# Patient Record
Sex: MALE
[De-identification: names, ages, dates, MRNs, and addresses within clinical notes are randomized per-mention and may not be internally consistent; named-entity substitution may affect disease eponyms.]

---

## 2017-03-28 PROBLEM — Z00.00 ENCOUNTER FOR PREVENTIVE HEALTH EXAMINATION: Status: ACTIVE | Noted: 2017-03-28

## 2017-06-29 ENCOUNTER — APPOINTMENT (OUTPATIENT)
Dept: NEUROLOGY | Facility: CLINIC | Age: 47
End: 2017-06-29

## 2017-07-21 ENCOUNTER — APPOINTMENT (OUTPATIENT)
Dept: NEUROLOGY | Facility: CLINIC | Age: 47
End: 2017-07-21

## 2024-08-14 ENCOUNTER — APPOINTMENT (OUTPATIENT)
Dept: PSYCHIATRY | Facility: CLINIC | Age: 54
End: 2024-08-14

## 2024-08-15 NOTE — HISTORY OF PRESENT ILLNESS
[Not Applicable] : Not applicable [FreeTextEntry1] : Mitul is a 54-year-old male domiciled in a private home with his ex-girlfriend on [address]. The patient reports that he will be moving out of their shared home in six weeks where he will then live independently on [address]. The patient is currently employed full time as an  with [employer], however, due to his anxiety and depression he has been unable to  overtime shifts that would significantly improve his financial situation. 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 The patient reports that he feels as if he is going to "jump out of my skin". He notes that he has had night sweats, however, he has not had them in a week. The patient reports that he is feeling depressed which includes having trouble getting up in the morning, feelings of sadness and periods of crying. The patient reports that these feelings have caused low self-esteem. The patient reports chronic anxiety. The patient reports that he has difficulty being around others and his extremities get "funny" and cold. The patient reports that these feelings are "unnerving". The patient is currently on 10mg of Lexapro that he is getting from his PCP, Dr. Acevedo. Patient reports that his PCP is also prescribing him Trazadone 100mg. The patient reports that the medication is helping him to fall asleep. The patient explains that he has been on the Trazadone since June and the Lexapro since July. The patient reports very little positive side effects of the medication. The patient denied any current suicidal ideations.   [FreeTextEntry2] : N/A  [FreeTextEntry3] : N/A

## 2024-08-15 NOTE — PSYCHOSOCIAL ASSESSMENT
[None known] : None known [All substances negative except as specified below] : All substances negative except as specified below [Other: _____] : [unfilled] [Competitive and integrated employment] : Competitive and integrated employment [35 hours or more] : 35 hours or more [Earned income] : earned income [None] : none [Private residence (home, apartment, rooming house, hotel, motel, supported housing, supported Single Room Occupancy (SRO),] : Private residence (home, apartment, rooming house, hotel, motel, supported housing, supported Single Room Occupancy (SRO), permanent housing programs, transient housing programs, and shelter plus care housing) [Client's spouse or domestic partner] : client's spouse or domestic partner [Yes (add details)] : Prior or current active [place]  service? Yes [No] : Patient has personal representation (legal guardian, representative payee, conservatorship)? No [FreeTextEntry2] : The patient reports that he has a good relationship with his ex-wife and her current partner. Patient explains that he would describe them along with two good friends as his main support system  [FreeTextEntry3] : Not very well. The patient reports that he rarely goes out and when he does go out it is very briefly  [FreeTextEntry1] : The patient identifies as Mu-ism. The patient is a heterosexual cisgender male of [place] descent  [Financially stable] : financially stable [Friend] : friend [Good] : good [Frequent Contact] : frequent contact [FreeTextEntry7] : Roselia (ex wife)

## 2024-08-15 NOTE — HISTORY OF PRESENT ILLNESS
[Not Applicable] : Not applicable [FreeTextEntry1] : Mitul is a 54-year-old male domiciled in a private home with his ex-girlfriend on [address]. The patient reports that he will be moving out of their shared home in six weeks where he will then live independently on [address]. The patient is currently employed full time as an  with [employer], however, due to his anxiety and depression he has been unable to  overtime shifts that would significantly improve his financial situation. 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 The patient reports that he feels as if he is going to "jump out of my skin". He notes that he has had night sweats, however, he has not had them in a week. The patient reports that he is feeling depressed which includes having trouble getting up in the morning, feelings of sadness and periods of crying. The patient reports that these feelings have caused low self-esteem. The patient reports chronic anxiety. The patient reports that he has difficulty being around others and his extremities get "funny" and cold. The patient reports that these feelings are "unnerving". The patient is currently on 10mg of Lexapro that he is getting from his PCP, Dr. Acevedo. Patient reports that his PCP is also prescribing him Trazadone 100mg. The patient reports that the medication is helping him to fall asleep. The patient explains that he has been on the Trazadone since June and the Lexapro since July. The patient reports very little positive side effects of the medication. The patient denied any current suicidal ideations.   [FreeTextEntry2] : N/A  [FreeTextEntry3] : N/A

## 2024-08-15 NOTE — REASON FOR VISIT
[Number can be texted] : number can be texted [OK  to leave message] : OK  to leave message [FreeTextEntry4] : 2pm  [FreeTextEntry3] : [email or web address]  [FreeTextEntry5] : English  [FreeTextEntry6] : Mitul  [FreeTextEntry7] : he/him/his  [Community Based Organization] : Community Based Organization [Non-[facility] Health Provider/Facility] : Non-[facility] Health Provider/Facility [Patient] : Patient [FreeTextEntry2] : The patient was referred to the clinic for the certified diagnosis of PTSD. The patient endorses periods of chronic anxiety and depression  [FreeTextEntry1] : "I want to feel peace and to feel calm"

## 2024-08-15 NOTE — RISK ASSESSMENT
[Clinical Interview] : Clinical Interview [Identifies reasons for living] : identifies reasons for living [Supportive social network of family or friends] : supportive social network of family or friends [Cultural, spiritual and/or moral attitudes against suicide] : cultural, spiritual and/or moral attitudes against suicide [Positive therapeutic relationships] : positive therapeutic relationships [Responsibility to children, family, or others] : responsibility to children, family, or others [(0) Does not apply] : Does not apply [Mood disorder] : mood disorder [PTSD] : PTSD [Depressed mood/Anhedonia] : depressed mood/anhedonia [Severe anxiety, agitation or panic] : severe anxiety, agitation or panic [None known] : None known [None in the patient's lifetime] : None in the patient's lifetime [Residential stability] : residential stability [Relationship stability] : relationship stability [Employment stability] : employment stability [Affective stability] : affective stability [Sobriety] : sobriety [Insight into violence risk and need for management/treatment] : insight into violence risk and need for management/treatment [Engagement in treatment] : engagement in treatment [Good treatment response/compliance] : good treatment response/compliance [No] : no

## 2024-08-15 NOTE — DISCUSSION/SUMMARY
[FreeTextEntry1] : Mitul is a 54-year-old male domiciled in a private home with his ex-girlfriend on [address]. The patient reports that he will be moving out of their shared home in six weeks where he will then live independently on [address]. The patient is currently employed full time as an  with [employer], however, due to his anxiety and depression he has been unable to  overtime shifts that would significantly improve his financial situation. 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 The patient reports that he feels as if he is going to "jump out of my skin". He notes that he has had night sweats, however, he has not had them in a week. The patient reports that he is feeling depressed which includes having trouble getting up in the morning, feelings of sadness and periods of crying. The patient reports that these feelings have caused low self-esteem. The patient reports chronic anxiety. The patient reports that he has difficulty being around others and his extremities get "funny" and cold. The patient reports that these feelings are "unnerving". The patient is currently on 10mg of Lexapro that he is getting from his PCP, Dr. Acevedo. Patient reports that his PCP is also prescribing him Trazadone 100mg. The patient reports that the medication is helping him to fall asleep. The patient explains that he has been on the Trazadone since June and the Lexapro since July. The patient reports very little positive side effects of the medication. The patient denied any current suicidal ideations.  [Low acute suicide risk] : Low acute suicide risk [No] : No [Not clinically indicated] : Safety Plan completed/updated (for individuals at risk): Not clinically indicated

## 2024-08-15 NOTE — SOCIAL HISTORY
[FreeTextEntry1] : Legal Status  Does individual Served have a Legal Guardian, rep Payee or Conservatorship? [ X] No [ ] Yes - Details: [ ]   Legal Involvement history  Does the individual have a history of or current involvement with the legal system?  [X ] No [ ] Yes - Details: [ ]    Services  Have you ever served in the ?  [ ] No [X ] Yes - Details: The patient reports that he was in the [facility] for less than a year in 1991   Employment history: The patient is employed as an    Developmental history: N/A   Sexual hx/identity Sexual History/ Concern (include sexual orientation and other relevant information): The patient identifies as a cisgender heterosexaul male   Race - ethnicity - culture information [ ]    Social supports (friends, Volunteers, club, AA meeting, other meetings) ?: The patient reports that he has a few friends and his ex-wife and her   Meaningful Activities: The patient reports that he has been going to lunch with friends and he will occassionally smoke a cigar    Spiritual Assessment Tool - MILADIS KENYON. What is your willian or belief? Scientology  Do you consider yourself spiritual or Nondenominational?  Both  Is there something you believe in that gives meaning to your life? "I am not sure"  I: Is it important in your life? N/A  What influence does it have on how you take care of yourself? N/A  How have your beliefs influenced your behavior during this illness? What role do your beliefs play in regaining your health? N/A C. Are you part of a spiritual or Nondenominational community? no  Is this of support to you and how? N/A  Is there a person or group of people you really love or who are really important to you? "I love my children"  H. We have been discussing your belief and supports. What else gives you internal support? "Not sure"  What are your sources of hope, strength, comfort and peace? "Thinking about my next move"  What do you hold on to during difficult times? "Knowing that I will not be like this forever"  what sustains you and keeps you going? "My future"  A. How would you like me, your healthcare provider, to address these issues in your healthcare? "We can talk about it"  SMOKING CESSATION  Do you Smoke?                              [ ] Yes		[X ] No Do you want to quit? 		[ ] Yes		[ ] No -ASK 	Number of cigatettes   [ ] cigars [ ]  pipe bowls [ ]  per day  	Number of ST cans/ pouches per week [ ] 	Number of years used [ ] 	How soon after you wake up do you use tobacco?  [ ] within 30 minutes      [ ] more than 30 minutes  	Previous quit attempts:  # of attempts [ ] longest quit period [ ] methods(s) used [ ] How long ago was last attempt to quit  [ ] years [ ] months  	Reasons for wanting to quit[ ] -ADVISE   about the oral benefits of quitting -ASSESS   willingness to make a quit attempt (Stage of Change)  	    [ ] Precontemplation (Stop here & Reassess next visit)	[ ] Contemplation [ ] Preparation   -ASSIST    (depending on stage of change)  	Self-Help Pamphlets & Materials 	List of local community group/individual quit programs and phone help lines 	Encourage a quit date (for those who are ready) 	Pharmacotherapy: Nicotine gum/ Lozenge/ Patch/ Inhaler/NS/Zyban/ Chantix  	RX: 	[ ]  ()  -ARRANGE  Follow up if set a quit date (with permission) Quit Date: [ ]  Phone calls or visits:  [ ] Week 1-2  Months   [ ] 1   [ ] 3   [ ] 6   [ ] 12   -Yearly Reassessment    [ ] No Changes of Smoking [ ] Change of Smoking Habit (Non-Smoker to Smoker/ Smoker to Non Smoker) (If there is change from Non Smoker to Smoker, please fill out new BRIEF TOBACCO CESSATION INTERVENTION FORM)  Date of Yearly Reassessment : [ ] Comments:  [ ]

## 2024-08-15 NOTE — FAMILY HISTORY
[FreeTextEntry1] : Family composition: The patient reports that has two female children ages 16 and 20 years Family history and background: The patient identifies as a  male of [place] descent  Family relationship: The patient reports that he periodically sees his children and he would describe the relationship with his family as "okay"  Pertinent Family Medical, MH and Substance Use History including Adult Child of Alcoholic and child of substance abuse status; history of cancer and heart disease: The patient denied any family history of substance use, the patient denied any family history of alcoholism, the patient reports that his mother may have had cancer however he is not certain. The patient reports that his mother may have had heart disease

## 2024-08-15 NOTE — PHYSICAL EXAM
[Well groomed] : well groomed [Cooperative] : cooperative [Depressed] : depressed [Anxious] : anxious [Flat] : flat [Clear] : clear [Linear/Goal Directed] : linear/goal directed [None] : none [None Reported] : none reported [Average] : average [WNL] : within normal limits [Individual reports tobacco use during the last 30 days?] : Individual reports tobacco use during the last 30 days? No [Individual reports use of the following tobacco products during the last 30 days?] : Individual reports use of the following tobacco products during the last 30 days? No [Individual reports current use of tobacco cessation medication or nicotine replacement therapy?] : Individual reports current use of tobacco cessation medication or nicotine replacement therapy? No [Was tobacco cessation medication and/or nicotine replacement therapy recommended?] : Was tobacco cessation medication and/or nicotine replacement therapy recommended? No [Does individual accept referral to MD for cessation medication or NRT?] : Does individual accept referral to MD for cessation medication or NRT? No

## 2024-08-15 NOTE — SOCIAL HISTORY
[FreeTextEntry1] : Legal Status  Does individual Served have a Legal Guardian, rep Payee or Conservatorship? [ X] No [ ] Yes - Details: [ ]   Legal Involvement history  Does the individual have a history of or current involvement with the legal system?  [X ] No [ ] Yes - Details: [ ]    Services  Have you ever served in the ?  [ ] No [X ] Yes - Details: The patient reports that he was in the [facility] for less than a year in 1991   Employment history: The patient is employed as an    Developmental history: N/A   Sexual hx/identity Sexual History/ Concern (include sexual orientation and other relevant information): The patient identifies as a cisgender heterosexaul male   Race - ethnicity - culture information [ ]    Social supports (friends, Volunteers, club, AA meeting, other meetings) ?: The patient reports that he has a few friends and his ex-wife and her   Meaningful Activities: The patient reports that he has been going to lunch with friends and he will occassionally smoke a cigar    Spiritual Assessment Tool - MILADIS KENYON. What is your willian or belief? Voodoo  Do you consider yourself spiritual or Orthodox?  Both  Is there something you believe in that gives meaning to your life? "I am not sure"  I: Is it important in your life? N/A  What influence does it have on how you take care of yourself? N/A  How have your beliefs influenced your behavior during this illness? What role do your beliefs play in regaining your health? N/A C. Are you part of a spiritual or Orthodox community? no  Is this of support to you and how? N/A  Is there a person or group of people you really love or who are really important to you? "I love my children"  H. We have been discussing your belief and supports. What else gives you internal support? "Not sure"  What are your sources of hope, strength, comfort and peace? "Thinking about my next move"  What do you hold on to during difficult times? "Knowing that I will not be like this forever"  what sustains you and keeps you going? "My future"  A. How would you like me, your healthcare provider, to address these issues in your healthcare? "We can talk about it"  SMOKING CESSATION  Do you Smoke?                              [ ] Yes		[X ] No Do you want to quit? 		[ ] Yes		[ ] No -ASK 	Number of cigatettes   [ ] cigars [ ]  pipe bowls [ ]  per day  	Number of ST cans/ pouches per week [ ] 	Number of years used [ ] 	How soon after you wake up do you use tobacco?  [ ] within 30 minutes      [ ] more than 30 minutes  	Previous quit attempts:  # of attempts [ ] longest quit period [ ] methods(s) used [ ] How long ago was last attempt to quit  [ ] years [ ] months  	Reasons for wanting to quit[ ] -ADVISE   about the oral benefits of quitting -ASSESS   willingness to make a quit attempt (Stage of Change)  	    [ ] Precontemplation (Stop here & Reassess next visit)	[ ] Contemplation [ ] Preparation   -ASSIST    (depending on stage of change)  	Self-Help Pamphlets & Materials 	List of local community group/individual quit programs and phone help lines 	Encourage a quit date (for those who are ready) 	Pharmacotherapy: Nicotine gum/ Lozenge/ Patch/ Inhaler/NS/Zyban/ Chantix  	RX: 	[ ]  ()  -ARRANGE  Follow up if set a quit date (with permission) Quit Date: [ ]  Phone calls or visits:  [ ] Week 1-2  Months   [ ] 1   [ ] 3   [ ] 6   [ ] 12   -Yearly Reassessment    [ ] No Changes of Smoking [ ] Change of Smoking Habit (Non-Smoker to Smoker/ Smoker to Non Smoker) (If there is change from Non Smoker to Smoker, please fill out new BRIEF TOBACCO CESSATION INTERVENTION FORM)  Date of Yearly Reassessment : [ ] Comments:  [ ]

## 2024-08-15 NOTE — REASON FOR VISIT
[Number can be texted] : number can be texted [OK  to leave message] : OK  to leave message [FreeTextEntry4] : 2pm  [FreeTextEntry3] : [email or web address]  [FreeTextEntry5] : English  [FreeTextEntry6] : Mitul  [FreeTextEntry7] : he/him/his  [Community Based Organization] : Community Based Organization [Non-[facility] Health Provider/Facility] : Non-[facility] Health Provider/Facility [Patient] : Patient [FreeTextEntry2] : The patient was referred to the clinic for the certified diagnosis of PTSD. The patient endorses periods of chronic anxiety and depression  [FreeTextEntry1] : "I want to feel peace and to feel calm"

## 2024-08-15 NOTE — DISCUSSION/SUMMARY
[FreeTextEntry1] : Mitul is a 54-year-old male domiciled in a private home with his ex-girlfriend on [address]. The patient reports that he will be moving out of their shared home in six weeks where he will then live independently on [address]. The patient is currently employed full time as an  with [employer], however, due to his anxiety and depression he has been unable to  overtime shifts that would significantly improve his financial situation. 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 The patient reports that he feels as if he is going to "jump out of my skin". He notes that he has had night sweats, however, he has not had them in a week. The patient reports that he is feeling depressed which includes having trouble getting up in the morning, feelings of sadness and periods of crying. The patient reports that these feelings have caused low self-esteem. The patient reports chronic anxiety. The patient reports that he has difficulty being around others and his extremities get "funny" and cold. The patient reports that these feelings are "unnerving". The patient is currently on 10mg of Lexapro that he is getting from his PCP, Dr. Acevedo. Patient reports that his PCP is also prescribing him Trazadone 100mg. The patient reports that the medication is helping him to fall asleep. The patient explains that he has been on the Trazadone since June and the Lexapro since July. The patient reports very little positive side effects of the medication. The patient denied any current suicidal ideations.  [Low acute suicide risk] : Low acute suicide risk [No] : No [Not clinically indicated] : Safety Plan completed/updated (for individuals at risk): Not clinically indicated

## 2024-08-15 NOTE — FAMILY HISTORY
[FreeTextEntry1] : Family composition: The patient reports that has two female children ages 16 and 20 years Family history and background: The patient identifies as a  male of [place] descent  Family relationship: The patient reports that he periodically sees his children and he would describe the relationship with his family as "okay"  Pertinent Family Medical, MH and Substance Use History including Adult Child of Alcoholic and child of substance abuse status; history of cancer and heart disease: The patient denied any family history of substance use, the patient denied any family history of alcoholism, the patient reports that his mother may have had cancer however he is not certain. The patient reports that his mother may have had heart disease

## 2024-08-15 NOTE — PLAN
[FreeTextEntry4] : Recommendation: [ ]      Medication Only [ ]     Individual therapy only [X ]     Medication and Individual therapy [ ]     Group therapy

## 2024-08-15 NOTE — PSYCHOSOCIAL ASSESSMENT
[None known] : None known [All substances negative except as specified below] : All substances negative except as specified below [Other: _____] : [unfilled] [Competitive and integrated employment] : Competitive and integrated employment [35 hours or more] : 35 hours or more [Earned income] : earned income [None] : none [Private residence (home, apartment, rooming house, hotel, motel, supported housing, supported Single Room Occupancy (SRO),] : Private residence (home, apartment, rooming house, hotel, motel, supported housing, supported Single Room Occupancy (SRO), permanent housing programs, transient housing programs, and shelter plus care housing) [Client's spouse or domestic partner] : client's spouse or domestic partner [Yes (add details)] : Prior or current active [place]  service? Yes [No] : Patient has personal representation (legal guardian, representative payee, conservatorship)? No [FreeTextEntry2] : The patient reports that he has a good relationship with his ex-wife and her current partner. Patient explains that he would describe them along with two good friends as his main support system  [FreeTextEntry3] : Not very well. The patient reports that he rarely goes out and when he does go out it is very briefly  [FreeTextEntry1] : The patient identifies as Anabaptist. The patient is a heterosexual cisgender male of [place] descent  [Financially stable] : financially stable [Friend] : friend [Good] : good [Frequent Contact] : frequent contact [FreeTextEntry7] : Roselia (ex wife)

## 2024-08-19 ENCOUNTER — OUTPATIENT (OUTPATIENT)
Dept: OUTPATIENT SERVICES | Facility: HOSPITAL | Age: 54
LOS: 1 days | End: 2024-08-19
Payer: COMMERCIAL

## 2024-08-19 ENCOUNTER — APPOINTMENT (OUTPATIENT)
Dept: PSYCHIATRY | Facility: CLINIC | Age: 54
End: 2024-08-19
Payer: COMMERCIAL

## 2024-08-19 DIAGNOSIS — F43.10 POST-TRAUMATIC STRESS DISORDER, UNSPECIFIED: ICD-10-CM

## 2024-08-19 DIAGNOSIS — F41.9 ANXIETY DISORDER, UNSPECIFIED: ICD-10-CM

## 2024-08-19 DIAGNOSIS — F32.1 MAJOR DEPRESSIVE DISORDER, SINGLE EPISODE, MODERATE: ICD-10-CM

## 2024-08-19 PROCEDURE — 90792 PSYCH DIAG EVAL W/MED SRVCS: CPT | Mod: 95

## 2024-08-19 PROCEDURE — 90792 PSYCH DIAG EVAL W/MED SRVCS: CPT

## 2024-08-19 NOTE — DISCUSSION/SUMMARY
[Low acute suicide risk] : Low acute suicide risk [No] : No [Not clinically indicated] : Safety Plan completed/updated (for individuals at risk): Not clinically indicated [FreeTextEntry1] : Pt reports symptoms of anxiety, depression. Due to time constraints due to technical difficulties, will schedule a follow up session to resume intake for later this week. Will plan to discuss medications/treatment plan at next visit.   PLAN 1) Follow up on 8/22 at 9AM 2) no med adjustments recommended at this time

## 2024-08-19 NOTE — SOCIAL HISTORY
[FreeTextEntry1] : Legal Status  Does individual Served have a Legal Guardian, rep Payee or Conservatorship? [ X] No [ ] Yes - Details: [ ]   Legal Involvement history  Does the individual have a history of or current involvement with the legal system?  [X ] No [ ] Yes - Details: [ ]    Services  Have you ever served in the ?  [ ] No [X ] Yes - Details: The patient reports that he was in the [facility] for less than a year in 1991   Employment history: The patient is employed as an    Developmental history: N/A   Sexual hx/identity Sexual History/ Concern (include sexual orientation and other relevant information): The patient identifies as a cisgender heterosexaul male   Race - ethnicity - culture information [ ]    Social supports (friends, Volunteers, club, AA meeting, other meetings) ?: The patient reports that he has a few friends and his ex-wife and her   Meaningful Activities: The patient reports that he has been going to lunch with friends and he will occassionally smoke a cigar    Spiritual Assessment Tool - MILADIS KENYON. What is your willian or belief? Mu-ism  Do you consider yourself spiritual or Pentecostalism?  Both  Is there something you believe in that gives meaning to your life? "I am not sure"  I: Is it important in your life? N/A  What influence does it have on how you take care of yourself? N/A  How have your beliefs influenced your behavior during this illness? What role do your beliefs play in regaining your health? N/A C. Are you part of a spiritual or Pentecostalism community? no  Is this of support to you and how? N/A  Is there a person or group of people you really love or who are really important to you? "I love my children"  H. We have been discussing your belief and supports. What else gives you internal support? "Not sure"  What are your sources of hope, strength, comfort and peace? "Thinking about my next move"  What do you hold on to during difficult times? "Knowing that I will not be like this forever"  what sustains you and keeps you going? "My future"  A. How would you like me, your healthcare provider, to address these issues in your healthcare? "We can talk about it"  SMOKING CESSATION  Do you Smoke?                              [ ] Yes		[X ] No Do you want to quit? 		[ ] Yes		[ ] No -ASK 	Number of cigatettes   [ ] cigars [ ]  pipe bowls [ ]  per day  	Number of ST cans/ pouches per week [ ] 	Number of years used [ ] 	How soon after you wake up do you use tobacco?  [ ] within 30 minutes      [ ] more than 30 minutes  	Previous quit attempts:  # of attempts [ ] longest quit period [ ] methods(s) used [ ] How long ago was last attempt to quit  [ ] years [ ] months  	Reasons for wanting to quit[ ] -ADVISE   about the oral benefits of quitting -ASSESS   willingness to make a quit attempt (Stage of Change)  	    [ ] Precontemplation (Stop here & Reassess next visit)	[ ] Contemplation [ ] Preparation   -ASSIST    (depending on stage of change)  	Self-Help Pamphlets & Materials 	List of local community group/individual quit programs and phone help lines 	Encourage a quit date (for those who are ready) 	Pharmacotherapy: Nicotine gum/ Lozenge/ Patch/ Inhaler/NS/Zyban/ Chantix  	RX: 	[ ]  ()  -ARRANGE  Follow up if set a quit date (with permission) Quit Date: [ ]  Phone calls or visits:  [ ] Week 1-2  Months   [ ] 1   [ ] 3   [ ] 6   [ ] 12   -Yearly Reassessment    [ ] No Changes of Smoking [ ] Change of Smoking Habit (Non-Smoker to Smoker/ Smoker to Non Smoker) (If there is change from Non Smoker to Smoker, please fill out new BRIEF TOBACCO CESSATION INTERVENTION FORM)  Date of Yearly Reassessment : [ ] Comments:  [ ]

## 2024-08-19 NOTE — PSYCHOSOCIAL ASSESSMENT
[None known] : None known [All substances negative except as specified below] : All substances negative except as specified below [Other: _____] : [unfilled] [Competitive and integrated employment] : Competitive and integrated employment [35 hours or more] : 35 hours or more [Earned income] : earned income [Financially stable] : financially stable [None] : none [Private residence (home, apartment, rooming house, hotel, motel, supported housing, supported Single Room Occupancy (SRO),] : Private residence (home, apartment, rooming house, hotel, motel, supported housing, supported Single Room Occupancy (SRO), permanent housing programs, transient housing programs, and shelter plus care housing) [Client's spouse or domestic partner] : client's spouse or domestic partner [Friend] : friend [Good] : good [Frequent Contact] : frequent contact [Yes (add details)] : Prior or current active [place]  service? Yes [No] : Patient has personal representation (legal guardian, representative payee, conservatorship)? No [FreeTextEntry2] : The patient reports that he has a good relationship with his ex-wife and her current partner. Patient explains that he would describe them along with two good friends as his main support system  [FreeTextEntry3] : Not very well. The patient reports that he rarely goes out and when he does go out it is very briefly  [FreeTextEntry1] : The patient identifies as Nondenominational. The patient is a heterosexual cisgender male of [place] descent  [FreeTextEntry7] : Roselia (ex wife)

## 2024-08-19 NOTE — HISTORY OF PRESENT ILLNESS
[Not Applicable] : Not applicable [FreeTextEntry1] : Patient is a 54 year old male, domiciled in home with ex girlfriend on [address], no pmh, currently on Lexapro 10mg and Trazodone 75mg qhs, no hx of psych inpatient hospitalizations, has never seen psychiatrist but has seen therapists in the past, currently employed as an  for [employer], has 2 children ages 16 and 20 presents for initial eval.   Pt presents hyperverbal, circumstantial. Pt reports worsening depression and anxiety since June of 2024 which led him to going to PCP who prescribed him Lexapro 10mg qd and Trazodone 100mg BID. Pt denies any external stressors during June. Pt reports he did initiate a breakup with his girlfriend back in October of 2023, states that he currently does live with his ex girlfriend but reports they live in separate areas of the home and he has a separate entrance thus he only has minimal interaction with her at this time. Pt describes anxiety as feeling uncomfortable, reports his arms and legs feel "light, weak and funny" especially when pt is driving. Pt endorses some numbness and tingling. Pt reports he feels exhausted but also restless. Pt reports finding it difficult to stay still, endorses fidgetiness as well and states he cant stop rubbing his arms.Pt reports intermittent night sweats as well. Pt denies any excessive worrying, reports he does not worry about his health or finances. Pt does admit however he is concerned about his current symptoms as he is unsure what is going on and states his self esteem is hurt. Pt does reports some increased irritability. Pt does endorse some increased nervousness. Pt reports difficulty focusing on activites such as watching TV. Pt reports he had been calling off from work intermittently due to his symptoms. Pt reports he has not been going into his regular shift at nighttime for over a month but will go in for "overtime work during the day". Pt reports anhedonia with loss of interest in going into work which pt reports he previously enjoyed doing so. Pt states he does not have much hobbies, reports that occaisonally spending time with friends will help him relax but reports he does not want to bother his friends repeatedly. Pt states he does not have a supportive relationship with his children.  Pt reports his children are "unempathetic". Pt cites two friends and his ex wife's  as his main support system. Pt does report some improvement in symptoms since being on Lexapro except for the last 5 days where he reports his symptoms worsened. Pt states in the past week, he has noticed heightened senstivity to sounds such as when his neighbor turns on his truck. Pt describes this improvement as "less dwelling on negative thoughts or fears both imagined and real, feeling more comfortable".    Pt reports middle insomnia since June. Pt reports waking up twice a night for 30 minutes to 2 hours at a time (sometimes unable to fall back asleep at all), reports middle insomnia on a nightly basis. Pt reports since taking "a half of Trazodone 100mg qd" he has been able to get a 4 hour stretch after falling asleep which has been an improvement as he would previously wake up after 2 hours of falling asleep.   Pt reports unintentional weight loss of 15lbs since June, report he had decreased appetite. Pt reports he did have routine bloodwork as part of his 9/11 physical which pt reports he underwent in the past two months. Pt reports his cholesterol was elevated but denies being started on any medical intervention.      AS PER PREVIOUS CHARTING: Mitul is a 54-year-old male domiciled in a private home with his ex-girlfriend on [address]. The patient reports that he will be moving out of their shared home in six weeks where he will then live independently on [address]. The patient is currently employed full time as an  with [employer], however, due to his anxiety and depression he has been unable to  overtime shifts that would significantly improve his financial situation. 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 The patient reports that he feels as if he is going to "jump out of my skin". He notes that he has had night sweats, however, he has not had them in a week. The patient reports that he is feeling depressed which includes having trouble getting up in the morning, feelings of sadness and periods of crying. The patient reports that these feelings have caused low self-esteem. The patient reports chronic anxiety. The patient reports that he has difficulty being around others and his extremities get "funny" and cold. The patient reports that these feelings are "unnerving". The patient is currently on 10mg of Lexapro that he is getting from his PCP, Dr. Acevedo. Patient reports that his PCP is also prescribing him Trazadone 100mg. The patient reports that the medication is helping him to fall asleep. The patient explains that he has been on the Trazadone since June and the Lexapro since July. The patient reports very little positive side effects of the medication. The patient denied any current suicidal ideations.   [FreeTextEntry2] : N/A  [FreeTextEntry3] : N/A

## 2024-08-19 NOTE — RISK ASSESSMENT
[Clinical Interview] : Clinical Interview [(0) Does not apply] : Does not apply [Mood disorder] : mood disorder [PTSD] : PTSD [Depressed mood/Anhedonia] : depressed mood/anhedonia [Severe anxiety, agitation or panic] : severe anxiety, agitation or panic [None known] : None known [Identifies reasons for living] : identifies reasons for living [Supportive social network of family or friends] : supportive social network of family or friends [Cultural, spiritual and/or moral attitudes against suicide] : cultural, spiritual and/or moral attitudes against suicide [Positive therapeutic relationships] : positive therapeutic relationships [Responsibility to children, family, or others] : responsibility to children, family, or others [None in the patient's lifetime] : None in the patient's lifetime [Residential stability] : residential stability [Relationship stability] : relationship stability [Employment stability] : employment stability [Affective stability] : affective stability [Sobriety] : sobriety [Insight into violence risk and need for management/treatment] : insight into violence risk and need for management/treatment [Engagement in treatment] : engagement in treatment [Good treatment response/compliance] : good treatment response/compliance [No] : no

## 2024-08-19 NOTE — REASON FOR VISIT
[Telehealth (audio & video) - Individual/Group] : This visit was provided via telehealth using real-time 2-way audio visual technology. [Other Location: e.g. Home (Enter Location, City,State)___] : The provider was located at [unfilled]. [Home] : The patient, [unfilled], was located at home, [unfilled], at the time of the visit. [Verbal consent obtained from patient/other participant(s)] : Verbal consent for telehealth/telephonic services obtained from patient/other participant(s) [Number can be texted] : number can be texted [OK  to leave message] : OK  to leave message [Community Based Organization] : Community Based Organization [Non-[facility] Provider/Facility] : Non-[facility] Provider/Facility [Patient] : Patient [FreeTextEntry4] : 2pm  [FreeTextEntry3] : [email or web address]  [FreeTextEntry5] : English  [FreeTextEntry6] : Mitul  [FreeTextEntry7] : he/him/his  [FreeTextEntry2] : The patient was referred to the clinic for the certified diagnosis of PTSD. The patient endorses periods of chronic anxiety and depression  [FreeTextEntry1] : "I want to feel peace and to feel calm"

## 2024-08-20 DIAGNOSIS — F43.10 POST-TRAUMATIC STRESS DISORDER, UNSPECIFIED: ICD-10-CM

## 2024-08-21 ENCOUNTER — APPOINTMENT (OUTPATIENT)
Dept: PSYCHIATRY | Facility: CLINIC | Age: 54
End: 2024-08-21

## 2024-08-22 ENCOUNTER — APPOINTMENT (OUTPATIENT)
Dept: PSYCHIATRY | Facility: CLINIC | Age: 54
End: 2024-08-22
Payer: COMMERCIAL

## 2024-08-22 PROCEDURE — 99214 OFFICE O/P EST MOD 30 MIN: CPT | Mod: 95

## 2024-08-22 RX ORDER — ESCITALOPRAM OXALATE 5 MG/1
5 TABLET ORAL
Qty: 30 | Refills: 0 | Status: ACTIVE | COMMUNITY
Start: 2024-08-22 | End: 1900-01-01

## 2024-08-22 RX ORDER — HYDROXYZINE PAMOATE 25 MG/1
25 CAPSULE ORAL
Qty: 60 | Refills: 0 | Status: ACTIVE | COMMUNITY
Start: 2024-08-22 | End: 1900-01-01

## 2024-08-22 RX ORDER — ESCITALOPRAM OXALATE 10 MG/1
10 TABLET ORAL DAILY
Qty: 30 | Refills: 0 | Status: ACTIVE | COMMUNITY
Start: 2024-08-22 | End: 1900-01-01

## 2024-08-22 NOTE — DISCUSSION/SUMMARY
[Low acute suicide risk] : Low acute suicide risk [No] : No [Not clinically indicated] : Safety Plan completed/updated (for individuals at risk): Not clinically indicated [FreeTextEntry1] : Will titrate Lexapro to aid with anxiety and depression. Will start Vistaril PRN to aid with breakthrough anxiety. Will recommend pt continue Trazodone 75 to 100mg qhs PRN for insomnia.  PLAN 1) increase Lexapro to 15mgqd  2) start Vistaril 25mg BID PRN for breakthrough anxiety 3) continue Trazodone 75 to 100mg qhs PRN for insomnia 4) follow up in 1 month

## 2024-08-22 NOTE — HISTORY OF PRESENT ILLNESS
[Not Applicable] : Not applicable [FreeTextEntry1] : AS PER INITIAL EVAL: Patient is a 54 year old male, domiciled in home with ex girlfriend on [address], no pmh, currently on Lexapro 10mg and Trazodone 75mg qhs, no hx of psych inpatient hospitalizations, has never seen psychiatrist but has seen therapists in the past, currently employed as an  for [employer], has 2 children ages 16 and 20 presents for initial eval.   Pt presents hyperverbal, circumstantial. Pt reports worsening depression and anxiety since June of 2024 which led him to going to PCP who prescribed him Lexapro 10mg qd and Trazodone 100mg BID. Pt denies any external stressors during June. Pt reports he did initiate a breakup with his girlfriend back in October of 2023, states that he currently does live with his ex girlfriend but reports they live in separate areas of the home and he has a separate entrance thus he only has minimal interaction with her at this time. Pt describes anxiety as feeling uncomfortable, reports his arms and legs feel "light, weak and funny" especially when pt is driving. Pt endorses some numbness and tingling. Pt reports he feels exhausted but also restless. Pt reports finding it difficult to stay still, endorses fidgetiness as well and states he cant stop rubbing his arms.Pt reports intermittent night sweats as well. Pt denies any excessive worrying, reports he does not worry about his health or finances. Pt does admit however he is concerned about his current symptoms as he is unsure what is going on and states his self esteem is hurt. Pt does reports some increased irritability. Pt does endorse some increased nervousness. Pt reports difficulty focusing on activites such as watching TV. Pt reports he had been calling off from work intermittently due to his symptoms. Pt reports he has not been going into his regular shift at nighttime for over a month but will go in for "overtime work during the day". Pt reports anhedonia with loss of interest in going into work which pt reports he previously enjoyed doing so. Pt states he does not have much hobbies, reports that occaisonally spending time with friends will help him relax but reports he does not want to bother his friends repeatedly. Pt states he does not have a supportive relationship with his children.  Pt reports his children are "unempathetic". Pt cites two friends and his ex wife's  as his main support system. Pt does report some improvement in symptoms since being on Lexapro except for the last 5 days where he reports his symptoms worsened. Pt states in the past week, he has noticed heightened senstivity to sounds such as when his neighbor turns on his truck. Pt describes this improvement as "less dwelling on negative thoughts or fears both imagined and real, feeling more comfortable".    Pt reports middle insomnia since June. Pt reports waking up twice a night for 30 minutes to 2 hours at a time (sometimes unable to fall back asleep at all), reports middle insomnia on a nightly basis. Pt reports since taking "a half of Trazodone 100mg qd" he has been able to get a 4 hour stretch after falling asleep which has been an improvement as he would previously wake up after 2 hours of falling asleep.   Pt reports unintentional weight loss of 15lbs since June, report he had decreased appetite. Pt reports he did have routine bloodwork as part of his 9/11 physical which pt reports he underwent in the past two months. Pt reports his cholesterol was elevated but denies being started on any medical intervention.       [FreeTextEntry2] : N/A  [FreeTextEntry3] : N/A

## 2024-08-27 ENCOUNTER — APPOINTMENT (OUTPATIENT)
Dept: PSYCHIATRY | Facility: CLINIC | Age: 54
End: 2024-08-27

## 2024-08-27 ENCOUNTER — OUTPATIENT (OUTPATIENT)
Dept: OUTPATIENT SERVICES | Facility: HOSPITAL | Age: 54
LOS: 1 days | End: 2024-08-27
Payer: COMMERCIAL

## 2024-08-27 DIAGNOSIS — F43.10 POST-TRAUMATIC STRESS DISORDER, UNSPECIFIED: ICD-10-CM

## 2024-08-27 PROCEDURE — 90832 PSYTX W PT 30 MINUTES: CPT

## 2024-08-27 NOTE — PLAN
[Cognitive and/or Behavior Therapy] : Cognitive and/or Behavior Therapy  [Psychoeducation] : Psychoeducation  [Skills training (all types)] : Skills training (all types)  [Supportive Therapy] : Supportive Therapy

## 2024-08-27 NOTE — REASON FOR VISIT
[Patient preference] : as per patient preference [Starting, patient seen in-person within last 6 months] : Telehealth services are being started as patient has seen in-person within last 6 months. [Telehealth (audio & video) - Individual/Group] : This visit was provided via telehealth using real-time 2-way audio visual technology. [Other Location: e.g. Home (Enter Location, City,State)___] : The provider was located at [unfilled]. [Home] : The patient, [unfilled], was located at home, [unfilled], at the time of the visit. [Verbal consent obtained from patient/other participant(s)] : Verbal consent for telehealth/telephonic services obtained from patient/other participant(s) [Patient] : Patient

## 2024-08-28 DIAGNOSIS — F43.10 POST-TRAUMATIC STRESS DISORDER, UNSPECIFIED: ICD-10-CM

## 2024-09-04 ENCOUNTER — APPOINTMENT (OUTPATIENT)
Dept: PSYCHIATRY | Facility: CLINIC | Age: 54
End: 2024-09-04

## 2024-09-04 ENCOUNTER — OUTPATIENT (OUTPATIENT)
Dept: OUTPATIENT SERVICES | Facility: HOSPITAL | Age: 54
LOS: 1 days | End: 2024-09-04
Payer: COMMERCIAL

## 2024-09-04 DIAGNOSIS — F43.10 POST-TRAUMATIC STRESS DISORDER, UNSPECIFIED: ICD-10-CM

## 2024-09-04 PROCEDURE — 90832 PSYTX W PT 30 MINUTES: CPT

## 2024-09-04 NOTE — REASON FOR VISIT
[Patient preference] : as per patient preference [Starting, patient seen in-person within last 6 months] : Telehealth services are being started as patient has seen in-person within last 6 months. [Telehealth (audio & video) - Individual/Group] : This visit was provided via telehealth using real-time 2-way audio visual technology. [Medical Office: ([facility])___] : The provider was located at the medical office in [unfilled]. [Home] : The patient, [unfilled], was located at home, [unfilled], at the time of the visit. [Verbal consent obtained from patient/other participant(s)] : Verbal consent for telehealth/telephonic services obtained from patient/other participant(s) [FreeTextEntry4] : 1pm  [FreeTextEntry5] : 1:30pm  [FreeTextEntry3] : the patient requested telehealth sessions and is appropriate for telehealth services  [Patient] : Patient [FreeTextEntry1] : psychotherapy follow up

## 2024-09-04 NOTE — PLAN
[FreeTextEntry2] : 1. Rapport building  2. Exploration of anxiety and depression  [Cognitive and/or Behavior Therapy] : Cognitive and/or Behavior Therapy  [Psychoeducation] : Psychoeducation  [Skills training (all types)] : Skills training (all types)  [Supportive Therapy] : Supportive Therapy [de-identified] : The patient attended his scheduled session with the writer via telehealth. The patient explained that he has been feeling less "chronically" anxious however, he has noticed some situational anxiety. The writer explored this with the patient and he noted that he gets anxious when he has a shift due to the fear of the unknown regarding work assignments. 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 The writer and the patient discussed the utilization of positive affirmations. The patient explained that he has been moving out his belongings out of the shared home that he currently resides with his ex girlfriend. The patient explained that he has realized that the best way to avoid conflict with his former partner is to stay apart at this time. The patient is looking forward to moving out independently. The  patient reports medication adherence. The writer will continue to provide the patient with support and encouragement where needed. The patient is scheduled for a follow up appointment on 9/11/2024 at 2:30pm via telehealth per the patient's request.  [Recommended Frequency of Visits: ____] : Recommended frequency of visits: [unfilled] [Return in ____ week(s)] : Return in [unfilled] week(s)

## 2024-09-04 NOTE — REASON FOR VISIT
[Patient preference] : as per patient preference [Starting, patient seen in-person within last 6 months] : Telehealth services are being started as patient has seen in-person within last 6 months. [Telehealth (audio & video) - Individual/Group] : This visit was provided via telehealth using real-time 2-way audio visual technology. [Medical Office: ([facility])___] : The provider was located at the medical office in [unfilled]. [Home] : The patient, [unfilled], was located at home, [unfilled], at the time of the visit. [Verbal consent obtained from patient/other participant(s)] : Verbal consent for telehealth/telephonic services obtained from patient/other participant(s) [FreeTextEntry4] : 1pm  [FreeTextEntry5] : 1:30pm  [FreeTextEntry3] : the patient requested telehealth sessions and is appropriate for telehealth services  [Patient] : Patient [FreeTextEntry1] : psychotherapy follow up

## 2024-09-04 NOTE — PLAN
[FreeTextEntry2] : 1. Rapport building  2. Exploration of anxiety and depression  [Cognitive and/or Behavior Therapy] : Cognitive and/or Behavior Therapy  [Psychoeducation] : Psychoeducation  [Skills training (all types)] : Skills training (all types)  [Supportive Therapy] : Supportive Therapy [de-identified] : The patient attended his scheduled session with the writer via telehealth. The patient explained that he has been feeling less "chronically" anxious however, he has noticed some situational anxiety. The writer explored this with the patient and he noted that he gets anxious when he has a shift due to the fear of the unknown regarding work assignments. 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 The writer and the patient discussed the utilization of positive affirmations. The patient explained that he has been moving out his belongings out of the shared home that he currently resides with his ex girlfriend. The patient explained that he has realized that the best way to avoid conflict with his former partner is to stay apart at this time. The patient is looking forward to moving out independently. The  patient reports medication adherence. The writer will continue to provide the patient with support and encouragement where needed. The patient is scheduled for a follow up appointment on 9/11/2024 at 2:30pm via telehealth per the patient's request.  [Recommended Frequency of Visits: ____] : Recommended frequency of visits: [unfilled] [Return in ____ week(s)] : Return in [unfilled] week(s)

## 2024-09-05 DIAGNOSIS — F43.10 POST-TRAUMATIC STRESS DISORDER, UNSPECIFIED: ICD-10-CM

## 2024-09-10 ENCOUNTER — OUTPATIENT (OUTPATIENT)
Dept: OUTPATIENT SERVICES | Facility: HOSPITAL | Age: 54
LOS: 1 days | End: 2024-09-10
Payer: COMMERCIAL

## 2024-09-10 ENCOUNTER — APPOINTMENT (OUTPATIENT)
Dept: PSYCHIATRY | Facility: CLINIC | Age: 54
End: 2024-09-10
Payer: COMMERCIAL

## 2024-09-10 DIAGNOSIS — F43.10 POST-TRAUMATIC STRESS DISORDER, UNSPECIFIED: ICD-10-CM

## 2024-09-10 PROCEDURE — 99214 OFFICE O/P EST MOD 30 MIN: CPT | Mod: 95

## 2024-09-10 RX ORDER — HYDROXYZINE HYDROCHLORIDE 10 MG/1
10 TABLET ORAL TWICE DAILY
Qty: 30 | Refills: 0 | Status: ACTIVE | COMMUNITY
Start: 2024-09-10 | End: 1900-01-01

## 2024-09-10 NOTE — DISCUSSION/SUMMARY
[Low acute suicide risk] : Low acute suicide risk [No] : No [Not clinically indicated] : Safety Plan completed/updated (for individuals at risk): Not clinically indicated [FreeTextEntry1] : Pt endorses improved mood with recent increase in Lexapro, will continue current dosage. Due to side effects of sedation with hydroxyzine 25mg, will reduce dose to hydroxyzine 10mg BID PRN for breakthrough anxiety. Pt reports sleep has improved and thus has not needed to use Trazodone regularly this month, agrees to hold on to his current supply as prescribed most recently by PCP to use if sleep worsens in the future.   PLAN 1) continue Lexapro  15mgqd  2) d/c Vistaril 25mg BID PRN due to side effects of sedation 3) continue Trazodone 75 to 100mg qhs PRN for insomnia (pt has prior supply from PCP, does not need script today) 4) follow up in  2 weeks 5) start Hydroxyzine 10mg BID PRN for breakthrough anxiety

## 2024-09-10 NOTE — HISTORY OF PRESENT ILLNESS
[Not Applicable] : Not applicable [FreeTextEntry1] : AS PER INITIAL EVAL: Patient is a 54 year old male, domiciled in home with ex girlfriend on [place], no pmh, currently on Lexapro 10mg and Trazodone 75mg qhs, no hx of psych inpatient hospitalizations, has never seen psychiatrist but has seen therapists in the past, currently employed as an  for [place] [employer], has 2 children ages 16 and 20 presents for initial eval.   Pt presents hyperverbal, circumstantial. Pt reports worsening depression and anxiety since June of 2024 which led him to going to PCP who prescribed him Lexapro 10mg qd and Trazodone 100mg BID. Pt denies any external stressors during June. Pt reports he did initiate a breakup with his girlfriend back in October of 2023, states that he currently does live with his ex girlfriend but reports they live in separate areas of the home and he has a separate entrance thus he only has minimal interaction with her at this time. Pt describes anxiety as feeling uncomfortable, reports his arms and legs feel "light, weak and funny" especially when pt is driving. Pt endorses some numbness and tingling. Pt reports he feels exhausted but also restless. Pt reports finding it difficult to stay still, endorses fidgetiness as well and states he cant stop rubbing his arms.Pt reports intermittent night sweats as well. Pt denies any excessive worrying, reports he does not worry about his health or finances. Pt does admit however he is concerned about his current symptoms as he is unsure what is going on and states his self esteem is hurt. Pt does reports some increased irritability. Pt does endorse some increased nervousness. Pt reports difficulty focusing on activites such as watching TV. Pt reports he had been calling off from work intermittently due to his symptoms. Pt reports he has not been going into his regular shift at nighttime for over a month but will go in for "overtime work during the day". Pt reports anhedonia with loss of interest in going into work which pt reports he previously enjoyed doing so. Pt states he does not have much hobbies, reports that occaisonally spending time with friends will help him relax but reports he does not want to bother his friends repeatedly. Pt states he does not have a supportive relationship with his children.  Pt reports his children are "unempathetic". Pt cites two friends and his ex wife's  as his main support system. Pt does report some improvement in symptoms since being on Lexapro except for the last 5 days where he reports his symptoms worsened. Pt states in the past week, he has noticed heightened senstivity to sounds such as when his neighbor turns on his truck. Pt describes this improvement as "less dwelling on negative thoughts or fears both imagined and real, feeling more comfortable".    Pt reports middle insomnia since June. Pt reports waking up twice a night for 30 minutes to 2 hours at a time (sometimes unable to fall back asleep at all), reports middle insomnia on a nightly basis. Pt reports since taking "a half of Trazodone 100mg qd" he has been able to get a 4 hour stretch after falling asleep which has been an improvement as he would previously wake up after 2 hours of falling asleep.   Pt reports unintentional weight loss of 15lbs since June, report he had decreased appetite. Pt reports he did have routine bloodwork as part of his 9/11 physical which pt reports he underwent in the past two months. Pt reports his cholesterol was elevated but denies being started on any medical intervention.       [FreeTextEntry2] : N/A  [FreeTextEntry3] : N/A

## 2024-09-11 ENCOUNTER — APPOINTMENT (OUTPATIENT)
Dept: PSYCHIATRY | Facility: CLINIC | Age: 54
End: 2024-09-11

## 2024-09-11 ENCOUNTER — OUTPATIENT (OUTPATIENT)
Dept: OUTPATIENT SERVICES | Facility: HOSPITAL | Age: 54
LOS: 1 days | End: 2024-09-11
Payer: COMMERCIAL

## 2024-09-11 DIAGNOSIS — F43.10 POST-TRAUMATIC STRESS DISORDER, UNSPECIFIED: ICD-10-CM

## 2024-09-11 PROCEDURE — 90832 PSYTX W PT 30 MINUTES: CPT

## 2024-09-11 NOTE — PLAN
[FreeTextEntry2] : 1. Rapport building  2. Exploration of anxiety and depression  [Cognitive and/or Behavior Therapy] : Cognitive and/or Behavior Therapy  [Psychoeducation] : Psychoeducation  [Skills training (all types)] : Skills training (all types)  [Supportive Therapy] : Supportive Therapy [de-identified] : The patient attended his scheduled session with the writer via telehealth. The patient noted that he has been feeling about "75% better. The patient noted that he has been feeling less anxious and jittery. The patient noted that his mood has been stable overall. The patient explained that he felt confident enough to  an overtime shift which was difficult for him to do in the past. The writer commended the patient. The patient noted that he has been setting boundaries with his former partner that he lives with including moving his belongings out of the home when she is not there. The patient reflects back on his relationship with his former partner. The patient explained that he is looking forward to living life independently. The patient reports medication adherence. The writer will continue to provide the patient with support and encouragement where needed. The patient is scheduled for a follow up appointment on 9/24/2024 at 2:30pm via telehealth per the patient's request.  [Recommended Frequency of Visits: ____] : Recommended frequency of visits: [unfilled] [Return in ____ week(s)] : Return in [unfilled] week(s)

## 2024-09-11 NOTE — REASON FOR VISIT
[Patient preference] : as per patient preference [Starting, patient seen in-person within last 6 months] : Telehealth services are being started as patient has seen in-person within last 6 months. [Telehealth (audio & video) - Individual/Group] : This visit was provided via telehealth using real-time 2-way audio visual technology. [Medical Office: ([facility])___] : The provider was located at the medical office in [unfilled]. [Home] : The patient, [unfilled], was located at home, [unfilled], at the time of the visit. [Patient's space is appropriate for telehealth and maintains privacy/confidentiality.] : Patient's space is appropriate for telehealth and maintains privacy/confidentiality. [Participant(s) identity verified] : Participant(s) identity verified. [FreeTextEntry4] : 2:30pm [FreeTextEntry5] : 3pm [FreeTextEntry3] : the patient requested telehealth sessions and is appropriate for telehealth services [Patient] : Patient [FreeTextEntry1] : psychotherapy follow up

## 2024-09-11 NOTE — REASON FOR VISIT
[Patient preference] : as per patient preference [Starting, patient seen in-person within last 6 months] : Telehealth services are being started as patient has seen in-person within last 6 months. [Telehealth (audio & video) - Individual/Group] : This visit was provided via telehealth using real-time 2-way audio visual technology. [Medical Office: ([facility])___] : The provider was located at the medical office in [unfilled]. [Home] : The patient, [unfilled], was located at home, [unfilled], at the time of the visit. [Patient's space is appropriate for telehealth and maintains privacy/confidentiality.] : Patient's space is appropriate for telehealth and maintains privacy/confidentiality. [Participant(s) identity verified] : Participant(s) identity verified. [FreeTextEntry4] : 2:30pm [FreeTextEntry5] : 3pm [FreeTextEntry3] : the patient requested telehealth sessions and is appropriate for telehealth services [Patient] : Patient [FreeTextEntry1] : psychotherapy follow up

## 2024-09-11 NOTE — PLAN
[FreeTextEntry2] : 1. Rapport building  2. Exploration of anxiety and depression  [Cognitive and/or Behavior Therapy] : Cognitive and/or Behavior Therapy  [Psychoeducation] : Psychoeducation  [Skills training (all types)] : Skills training (all types)  [Supportive Therapy] : Supportive Therapy [de-identified] : The patient attended his scheduled session with the writer via telehealth. The patient noted that he has been feeling about "75% better. The patient noted that he has been feeling less anxious and jittery. The patient noted that his mood has been stable overall. The patient explained that he felt confident enough to  an overtime shift which was difficult for him to do in the past. The writer commended the patient. The patient noted that he has been setting boundaries with his former partner that he lives with including moving his belongings out of the home when she is not there. The patient reflects back on his relationship with his former partner. The patient explained that he is looking forward to living life independently. The patient reports medication adherence. The writer will continue to provide the patient with support and encouragement where needed. The patient is scheduled for a follow up appointment on 9/24/2024 at 2:30pm via telehealth per the patient's request.  [Recommended Frequency of Visits: ____] : Recommended frequency of visits: [unfilled] [Return in ____ week(s)] : Return in [unfilled] week(s)

## 2024-09-11 NOTE — PLAN
[FreeTextEntry2] : 1. Rapport building  2. Exploration of anxiety and depression  [Cognitive and/or Behavior Therapy] : Cognitive and/or Behavior Therapy  [Psychoeducation] : Psychoeducation  [Skills training (all types)] : Skills training (all types)  [Supportive Therapy] : Supportive Therapy [de-identified] : The patient attended his scheduled session with the writer via telehealth. The patient noted that he has been feeling about "75% better. The patient noted that he has been feeling less anxious and jittery. The patient noted that his mood has been stable overall. The patient explained that he felt confident enough to  an overtime shift which was difficult for him to do in the past. The writer commended the patient. The patient noted that he has been setting boundaries with his former partner that he lives with including moving his belongings out of the home when she is not there. The patient reflects back on his relationship with his former partner. The patient explained that he is looking forward to living life independently. The patient reports medication adherence. The writer will continue to provide the patient with support and encouragement where needed. The patient is scheduled for a follow up appointment on 9/24/2024 at 2:30pm via telehealth per the patient's request.  [Recommended Frequency of Visits: ____] : Recommended frequency of visits: [unfilled] [Return in ____ week(s)] : Return in [unfilled] week(s)

## 2024-09-12 DIAGNOSIS — F43.10 POST-TRAUMATIC STRESS DISORDER, UNSPECIFIED: ICD-10-CM

## 2024-09-20 NOTE — DISCUSSION/SUMMARY
[Initial Plan] : Initial Plan [Able to manage surrounding demands and opportunities] : able to manage surrounding demands and opportunities [Able to exercise self-direction] : able to exercise self-direction [Able to set and pursue goals] : able to set and pursue goals [Adherent to treatment recommendations] : adherent to treatment recommendations [Articulate] : articulate [Attempting to realize their potential] : Attempting to realize their potential [Cognitively intact] : cognitively intact [Good impulse control] : good impulse control [Insightful] : insightful [Creative] : creative [Intelligent] : intelligent [Motivated to participate in treatment] : motivated to participate in treatment [Motivated and ready for change] : motivated and ready for change [Health literacy] : health literacy [Motivated to maintain or improve physical health] : motivated to maintain or improve physical health [In good health] : in good health [Financially stable] : financially stable [Part of a supportive family] : part of a supportive family [Steady employment] : steady employment [Housing stability] : housing stability [English fluency] : English fluency [Connected to healthcare] : connected to healthcare [Access to safe outdoor spaces] : access to safe outdoor spaces [Social supports] : social supports [FreeTextEntry2] : 09/11/2025 [FreeTextEntry3] : 08/19/2024 [FreeTextEntry6] : The patient is currently working full time  [FreeTextEntry7] : The patient is currently in the process of moving out of the home that he shares with his former girlfriend. The patient noted that he would describe the relationship with his former partner as "toxic"  [FreeTextEntry8] : None indicated  [FreeTextEntry9] : The patient identifies as a Yazidism  [de-identified] : The writer will consult with the patient's psychiatrist as needed [Mental Health] : Mental Health [Physical Health] : Physical Health [Initial] : Initial [every ___ months] : every [unfilled] months [every ___ weeks] : every [unfilled] weeks [Improvement in symptoms as evidenced by:] : Improvement in symptoms as evidenced by: [Attainment of higher level of functioning as evidenced by:] : Attainment of higher level of functioning as evidenced by: [None - Reason others did not participate:] : None - Reason others did not participate:  [Yes] : Yes [Psychiatric Provider/Prescriber] : Psychiatric Provider/Prescriber [Therapist] : Therapist [Supervisor (if needed)] : Supervisor [de-identified] : The patient reports that living in his current home has been triggering for him as he is residing with his former partner, however, he is living in a seperate section of the home. The patient is looking forward to the sale of the home so he can move out [de-identified] : The patient will attend scheduled medication management appointments and will adhere to psychotropic medications as prescribed  [de-identified] : 09/11/2025 [FreeTextEntry5] : The writer will utilize CBT, DBT and supportive therapy  [FreeTextEntry1] : Health maintenance (assessed long term)  [FreeTextEntry4] : "I know that I have been struggling with anxiety and depression and this is something that I need to work on"  [de-identified] : The patient will attend scheduled medical appointments including yearly [facility] health monitoring appointments and will take medications as prescribed [de-identified] : 09/11/2025 [de-identified] : [facility]  [de-identified] : The patient is seen by Anna Palomino (hybrid)  [de-identified] : The patient expresses improvement in performing activities of daily living and/or says progress with initial complaint symptoms. In addition, the treatment team will conduct diagnose-based screenings as needed. [de-identified] :  The patient expresses improvement in performing activities of daily living and/or says progress with initial complaint symptoms. In addition, the treatment team will conduct diagnose-based screenings as needed. [de-identified] : not clinically indicated  [Tobacco Screening Completed?] : Tobacco Screening Completed: Yes

## 2024-09-20 NOTE — DISCUSSION/SUMMARY
[Initial Plan] : Initial Plan [Able to manage surrounding demands and opportunities] : able to manage surrounding demands and opportunities [Able to exercise self-direction] : able to exercise self-direction [Able to set and pursue goals] : able to set and pursue goals [Adherent to treatment recommendations] : adherent to treatment recommendations [Articulate] : articulate [Attempting to realize their potential] : Attempting to realize their potential [Cognitively intact] : cognitively intact [Good impulse control] : good impulse control [Insightful] : insightful [Creative] : creative [Intelligent] : intelligent [Motivated to participate in treatment] : motivated to participate in treatment [Motivated and ready for change] : motivated and ready for change [Health literacy] : health literacy [Motivated to maintain or improve physical health] : motivated to maintain or improve physical health [In good health] : in good health [Financially stable] : financially stable [Part of a supportive family] : part of a supportive family [Steady employment] : steady employment [Housing stability] : housing stability [English fluency] : English fluency [Connected to healthcare] : connected to healthcare [Access to safe outdoor spaces] : access to safe outdoor spaces [Social supports] : social supports [FreeTextEntry2] : 09/11/2025 [FreeTextEntry3] : 08/19/2024 [FreeTextEntry6] : The patient is currently working full time  [FreeTextEntry7] : The patient is currently in the process of moving out of the home that he shares with his former girlfriend. The patient noted that he would describe the relationship with his former partner as "toxic"  [FreeTextEntry8] : None indicated  [FreeTextEntry9] : The patient identifies as a Jew  [de-identified] : The writer will consult with the patient's psychiatrist as needed [Mental Health] : Mental Health [Physical Health] : Physical Health [Initial] : Initial [every ___ months] : every [unfilled] months [every ___ weeks] : every [unfilled] weeks [Improvement in symptoms as evidenced by:] : Improvement in symptoms as evidenced by: [Attainment of higher level of functioning as evidenced by:] : Attainment of higher level of functioning as evidenced by: [None - Reason others did not participate:] : None - Reason others did not participate:  [Yes] : Yes [Psychiatric Provider/Prescriber] : Psychiatric Provider/Prescriber [Therapist] : Therapist [Supervisor (if needed)] : Supervisor [de-identified] : The patient reports that living in his current home has been triggering for him as he is residing with his former partner, however, he is living in a seperate section of the home. The patient is looking forward to the sale of the home so he can move out [de-identified] : The patient will attend scheduled medication management appointments and will adhere to psychotropic medications as prescribed  [de-identified] : 09/11/2025 [FreeTextEntry5] : The writer will utilize CBT, DBT and supportive therapy  [FreeTextEntry1] : Health maintenance (assessed long term)  [FreeTextEntry4] : "I know that I have been struggling with anxiety and depression and this is something that I need to work on"  [de-identified] : The patient will attend scheduled medical appointments including yearly [facility] health monitoring appointments and will take medications as prescribed [de-identified] : 09/11/2025 [de-identified] : [facility]  [de-identified] : The patient is seen by Anna Palomino (hybrid)  [de-identified] : The patient expresses improvement in performing activities of daily living and/or says progress with initial complaint symptoms. In addition, the treatment team will conduct diagnose-based screenings as needed. [de-identified] :  The patient expresses improvement in performing activities of daily living and/or says progress with initial complaint symptoms. In addition, the treatment team will conduct diagnose-based screenings as needed. [de-identified] : not clinically indicated  [Tobacco Screening Completed?] : Tobacco Screening Completed: Yes

## 2024-09-20 NOTE — PHYSICAL EXAM
[Individual reports tobacco use during the last 30 days?] : Individual reports tobacco use during the last 30 days? No [Individual reports use of the following tobacco products during the last 30 days?] : Individual reports use of the following tobacco products during the last 30 days? No [Individual reports current use of tobacco cessation medication or nicotine replacement therapy?] : Individual reports current use of tobacco cessation medication or nicotine replacement therapy? No [Was tobacco cessation medication and/or nicotine replacement therapy recommended?] : Was tobacco cessation medication and/or nicotine replacement therapy recommended? No [Does individual accept referral to MD for cessation medication or NRT?] : Does individual accept referral to MD for cessation medication or NRT? No

## 2024-09-20 NOTE — DISCUSSION/SUMMARY
[Initial Plan] : Initial Plan [Able to manage surrounding demands and opportunities] : able to manage surrounding demands and opportunities [Able to exercise self-direction] : able to exercise self-direction [Able to set and pursue goals] : able to set and pursue goals [Adherent to treatment recommendations] : adherent to treatment recommendations [Articulate] : articulate [Attempting to realize their potential] : Attempting to realize their potential [Cognitively intact] : cognitively intact [Good impulse control] : good impulse control [Insightful] : insightful [Creative] : creative [Intelligent] : intelligent [Motivated to participate in treatment] : motivated to participate in treatment [Motivated and ready for change] : motivated and ready for change [Health literacy] : health literacy [Motivated to maintain or improve physical health] : motivated to maintain or improve physical health [In good health] : in good health [Financially stable] : financially stable [Part of a supportive family] : part of a supportive family [Steady employment] : steady employment [Housing stability] : housing stability [English fluency] : English fluency [Connected to healthcare] : connected to healthcare [Access to safe outdoor spaces] : access to safe outdoor spaces [Social supports] : social supports [FreeTextEntry2] : 09/11/2025 [FreeTextEntry3] : 08/19/2024 [FreeTextEntry6] : The patient is currently working full time  [FreeTextEntry7] : The patient is currently in the process of moving out of the home that he shares with his former girlfriend. The patient noted that he would describe the relationship with his former partner as "toxic"  [FreeTextEntry8] : None indicated  [FreeTextEntry9] : The patient identifies as a Zoroastrian  [de-identified] : The writer will consult with the patient's psychiatrist as needed [Mental Health] : Mental Health [Physical Health] : Physical Health [Initial] : Initial [every ___ months] : every [unfilled] months [every ___ weeks] : every [unfilled] weeks [Improvement in symptoms as evidenced by:] : Improvement in symptoms as evidenced by: [Attainment of higher level of functioning as evidenced by:] : Attainment of higher level of functioning as evidenced by: [None - Reason others did not participate:] : None - Reason others did not participate:  [Yes] : Yes [Psychiatric Provider/Prescriber] : Psychiatric Provider/Prescriber [Therapist] : Therapist [Supervisor (if needed)] : Supervisor [de-identified] : The patient reports that living in his current home has been triggering for him as he is residing with his former partner, however, he is living in a seperate section of the home. The patient is looking forward to the sale of the home so he can move out [de-identified] : The patient will attend scheduled medication management appointments and will adhere to psychotropic medications as prescribed  [de-identified] : 09/11/2025 [FreeTextEntry5] : The writer will utilize CBT, DBT and supportive therapy  [FreeTextEntry1] : Health maintenance (assessed long term)  [FreeTextEntry4] : "I know that I have been struggling with anxiety and depression and this is something that I need to work on"  [de-identified] : The patient will attend scheduled medical appointments including yearly [facility] health monitoring appointments and will take medications as prescribed [de-identified] : 09/11/2025 [de-identified] : [facility]  [de-identified] : The patient is seen by Anna Palomino (hybrid)  [de-identified] : The patient expresses improvement in performing activities of daily living and/or says progress with initial complaint symptoms. In addition, the treatment team will conduct diagnose-based screenings as needed. [de-identified] :  The patient expresses improvement in performing activities of daily living and/or says progress with initial complaint symptoms. In addition, the treatment team will conduct diagnose-based screenings as needed. [de-identified] : not clinically indicated  [Tobacco Screening Completed?] : Tobacco Screening Completed: Yes

## 2024-09-20 NOTE — DISCUSSION/SUMMARY
[Initial Plan] : Initial Plan [Able to manage surrounding demands and opportunities] : able to manage surrounding demands and opportunities [Able to exercise self-direction] : able to exercise self-direction [Able to set and pursue goals] : able to set and pursue goals [Adherent to treatment recommendations] : adherent to treatment recommendations [Articulate] : articulate [Attempting to realize their potential] : Attempting to realize their potential [Cognitively intact] : cognitively intact [Good impulse control] : good impulse control [Insightful] : insightful [Creative] : creative [Intelligent] : intelligent [Motivated to participate in treatment] : motivated to participate in treatment [Motivated and ready for change] : motivated and ready for change [Health literacy] : health literacy [Motivated to maintain or improve physical health] : motivated to maintain or improve physical health [In good health] : in good health [Financially stable] : financially stable [Part of a supportive family] : part of a supportive family [Steady employment] : steady employment [Housing stability] : housing stability [English fluency] : English fluency [Connected to healthcare] : connected to healthcare [Access to safe outdoor spaces] : access to safe outdoor spaces [Social supports] : social supports [FreeTextEntry2] : 09/11/2025 [FreeTextEntry3] : 08/19/2024 [FreeTextEntry6] : The patient is currently working full time  [FreeTextEntry7] : The patient is currently in the process of moving out of the home that he shares with his former girlfriend. The patient noted that he would describe the relationship with his former partner as "toxic"  [FreeTextEntry8] : None indicated  [FreeTextEntry9] : The patient identifies as a Jain  [de-identified] : The writer will consult with the patient's psychiatrist as needed [Mental Health] : Mental Health [Physical Health] : Physical Health [Initial] : Initial [every ___ months] : every [unfilled] months [every ___ weeks] : every [unfilled] weeks [Improvement in symptoms as evidenced by:] : Improvement in symptoms as evidenced by: [Attainment of higher level of functioning as evidenced by:] : Attainment of higher level of functioning as evidenced by: [None - Reason others did not participate:] : None - Reason others did not participate:  [Yes] : Yes [Psychiatric Provider/Prescriber] : Psychiatric Provider/Prescriber [Therapist] : Therapist [Supervisor (if needed)] : Supervisor [de-identified] : The patient reports that living in his current home has been triggering for him as he is residing with his former partner, however, he is living in a seperate section of the home. The patient is looking forward to the sale of the home so he can move out [de-identified] : The patient will attend scheduled medication management appointments and will adhere to psychotropic medications as prescribed  [de-identified] : 09/11/2025 [FreeTextEntry5] : The writer will utilize CBT, DBT and supportive therapy  [FreeTextEntry1] : Health maintenance (assessed long term)  [FreeTextEntry4] : "I know that I have been struggling with anxiety and depression and this is something that I need to work on"  [de-identified] : The patient will attend scheduled medical appointments including yearly [facility] health monitoring appointments and will take medications as prescribed [de-identified] : 09/11/2025 [de-identified] : [facility]  [de-identified] : The patient is seen by Anna Palomino (hybrid)  [de-identified] : The patient expresses improvement in performing activities of daily living and/or says progress with initial complaint symptoms. In addition, the treatment team will conduct diagnose-based screenings as needed. [de-identified] :  The patient expresses improvement in performing activities of daily living and/or says progress with initial complaint symptoms. In addition, the treatment team will conduct diagnose-based screenings as needed. [de-identified] : not clinically indicated  [Tobacco Screening Completed?] : Tobacco Screening Completed: Yes

## 2024-09-23 ENCOUNTER — APPOINTMENT (OUTPATIENT)
Dept: PSYCHIATRY | Facility: CLINIC | Age: 54
End: 2024-09-23
Payer: COMMERCIAL

## 2024-09-23 ENCOUNTER — OUTPATIENT (OUTPATIENT)
Dept: OUTPATIENT SERVICES | Facility: HOSPITAL | Age: 54
LOS: 1 days | End: 2024-09-23
Payer: COMMERCIAL

## 2024-09-23 DIAGNOSIS — F43.10 POST-TRAUMATIC STRESS DISORDER, UNSPECIFIED: ICD-10-CM

## 2024-09-23 PROCEDURE — 99213 OFFICE O/P EST LOW 20 MIN: CPT | Mod: 95

## 2024-09-23 NOTE — DISCUSSION/SUMMARY
[FreeTextEntry1] : Will continue current med regimen due to ongoing benefit. Pt informed of provider's upcoming departure from clinic.   PLAN 1) continue Lexapro  15mg qd  3) continue Trazodone 75 to 100mg qhs PRN for insomnia (pt has prior supply from PCP, does not need script today as he uses it infrequently) 4) follow up in 1 month 5) continue Hydroxyzine 10mg BID PRN for breakthrough anxiety (no script sent today as pt has prior supply) [Low acute suicide risk] : Low acute suicide risk [No] : No [Not clinically indicated] : Safety Plan completed/updated (for individuals at risk): Not clinically indicated

## 2024-09-23 NOTE — HISTORY OF PRESENT ILLNESS
[Not Applicable] : Not applicable [FreeTextEntry1] : AS PER INITIAL EVAL: Patient is a 54 year old male, domiciled in home with ex girlfriend on [place], no pmh, currently on Lexapro 10mg and Trazodone 75mg qhs, no hx of psych inpatient hospitalizations, has never seen psychiatrist but has seen therapists in the past, currently employed as an  for [employer], has 2 children ages 16 and 20 presents for initial eval.   Pt presents hyperverbal, circumstantial. Pt reports worsening depression and anxiety since June of 2024 which led him to going to PCP who prescribed him Lexapro 10mg qd and Trazodone 100mg BID. Pt denies any external stressors during June. Pt reports he did initiate a breakup with his girlfriend back in October of 2023, states that he currently does live with his ex girlfriend but reports they live in separate areas of the home and he has a separate entrance thus he only has minimal interaction with her at this time. Pt describes anxiety as feeling uncomfortable, reports his arms and legs feel "light, weak and funny" especially when pt is driving. Pt endorses some numbness and tingling. Pt reports he feels exhausted but also restless. Pt reports finding it difficult to stay still, endorses fidgetiness as well and states he cant stop rubbing his arms.Pt reports intermittent night sweats as well. Pt denies any excessive worrying, reports he does not worry about his health or finances. Pt does admit however he is concerned about his current symptoms as he is unsure what is going on and states his self esteem is hurt. Pt does reports some increased irritability. Pt does endorse some increased nervousness. Pt reports difficulty focusing on activites such as watching TV. Pt reports he had been calling off from work intermittently due to his symptoms. Pt reports he has not been going into his regular shift at nighttime for over a month but will go in for "overtime work during the day". Pt reports anhedonia with loss of interest in going into work which pt reports he previously enjoyed doing so. Pt states he does not have much hobbies, reports that occaisonally spending time with friends will help him relax but reports he does not want to bother his friends repeatedly. Pt states he does not have a supportive relationship with his children.  Pt reports his children are "unempathetic". Pt cites two friends and his ex wife's  as his main support system. Pt does report some improvement in symptoms since being on Lexapro except for the last 5 days where he reports his symptoms worsened. Pt states in the past week, he has noticed heightened senstivity to sounds such as when his neighbor turns on his truck. Pt describes this improvement as "less dwelling on negative thoughts or fears both imagined and real, feeling more comfortable".    Pt reports middle insomnia since June. Pt reports waking up twice a night for 30 minutes to 2 hours at a time (sometimes unable to fall back asleep at all), reports middle insomnia on a nightly basis. Pt reports since taking "a half of Trazodone 100mg qd" he has been able to get a 4 hour stretch after falling asleep which has been an improvement as he would previously wake up after 2 hours of falling asleep.   Pt reports unintentional weight loss of 15lbs since June, report he had decreased appetite. Pt reports he did have routine bloodwork as part of his 9/11 physical which pt reports he underwent in the past two months. Pt reports his cholesterol was elevated but denies being started on any medical intervention.       [FreeTextEntry2] : N/A  [FreeTextEntry3] : N/A

## 2024-09-24 ENCOUNTER — OUTPATIENT (OUTPATIENT)
Dept: OUTPATIENT SERVICES | Facility: HOSPITAL | Age: 54
LOS: 1 days | End: 2024-09-24
Payer: COMMERCIAL

## 2024-09-24 ENCOUNTER — APPOINTMENT (OUTPATIENT)
Dept: PSYCHIATRY | Facility: CLINIC | Age: 54
End: 2024-09-24

## 2024-09-24 DIAGNOSIS — F43.10 POST-TRAUMATIC STRESS DISORDER, UNSPECIFIED: ICD-10-CM

## 2024-09-24 PROCEDURE — 90832 PSYTX W PT 30 MINUTES: CPT

## 2024-09-24 NOTE — PLAN
[FreeTextEntry2] : 1. Rapport building  2. Exploration of anxiety and depression  [Cognitive and/or Behavior Therapy] : Cognitive and/or Behavior Therapy  [Psychoeducation] : Psychoeducation  [Skills training (all types)] : Skills training (all types)  [Supportive Therapy] : Supportive Therapy [de-identified] : The patient attended his scheduled session with the writer via telehealth. The patient noted that his anxiety and mood continue to be noticeably better. The patient explained that he has been able to work more hours at work which has been keeping him occupied. The patient is anticipating that he will be moved out of his current residence within the next three weeks. The patient continues to process feelings surrounding his former relationship. The patient explained that he often was belittled and made to feel as if "everything is my fault". The writer and the patient discussed emotional projection. The patient reports medication adherence. The writer will continue to provide the patient with support and encouragement where needed. The patient is scheduled for a follow up appointment on 10/8/2024 at 12:30pm via telehealth per the patient's request.  [Recommended Frequency of Visits: ____] : Recommended frequency of visits: [unfilled] [Return in ____ week(s)] : Return in [unfilled] week(s)

## 2024-09-24 NOTE — PLAN
[FreeTextEntry2] : 1. Rapport building  2. Exploration of anxiety and depression  [Cognitive and/or Behavior Therapy] : Cognitive and/or Behavior Therapy  [Psychoeducation] : Psychoeducation  [Skills training (all types)] : Skills training (all types)  [Supportive Therapy] : Supportive Therapy [de-identified] : The patient attended his scheduled session with the writer via telehealth. The patient noted that his anxiety and mood continue to be noticeably better. The patient explained that he has been able to work more hours at work which has been keeping him occupied. The patient is anticipating that he will be moved out of his current residence within the next three weeks. The patient continues to process feelings surrounding his former relationship. The patient explained that he often was belittled and made to feel as if "everything is my fault". The writer and the patient discussed emotional projection. The patient reports medication adherence. The writer will continue to provide the patient with support and encouragement where needed. The patient is scheduled for a follow up appointment on 10/8/2024 at 12:30pm via telehealth per the patient's request.  [Recommended Frequency of Visits: ____] : Recommended frequency of visits: [unfilled] [Return in ____ week(s)] : Return in [unfilled] week(s)

## 2024-09-24 NOTE — REASON FOR VISIT
[Patient preference] : as per patient preference [Starting, patient seen in-person within last 6 months] : Telehealth services are being started as patient has seen in-person within last 6 months. [Telehealth (audio & video) - Individual/Group] : This visit was provided via telehealth using real-time 2-way audio visual technology. [Other Location: e.g. Home (Enter Location, City,State)___] : The provider was located at [unfilled]. [Home] : The patient, [unfilled], was located at home, [unfilled], at the time of the visit. [Patient's space is appropriate for telehealth and maintains privacy/confidentiality.] : Patient's space is appropriate for telehealth and maintains privacy/confidentiality. [Participant(s) identity verified] : Participant(s) identity verified. [FreeTextEntry4] : 2:30pm  [FreeTextEntry5] : 3pm  [FreeTextEntry3] : the patient requested telehealth sessions and is appropriate for telehealth services  [Patient] : Patient [FreeTextEntry1] : psychotherapy follow up

## 2024-09-25 DIAGNOSIS — F43.10 POST-TRAUMATIC STRESS DISORDER, UNSPECIFIED: ICD-10-CM

## 2024-10-08 ENCOUNTER — OUTPATIENT (OUTPATIENT)
Dept: OUTPATIENT SERVICES | Facility: HOSPITAL | Age: 54
LOS: 1 days | End: 2024-10-08
Payer: COMMERCIAL

## 2024-10-08 ENCOUNTER — APPOINTMENT (OUTPATIENT)
Dept: PSYCHIATRY | Facility: CLINIC | Age: 54
End: 2024-10-08

## 2024-10-08 PROCEDURE — 90832 PSYTX W PT 30 MINUTES: CPT

## 2024-10-09 DIAGNOSIS — F43.10 POST-TRAUMATIC STRESS DISORDER, UNSPECIFIED: ICD-10-CM

## 2024-10-21 ENCOUNTER — OUTPATIENT (OUTPATIENT)
Dept: OUTPATIENT SERVICES | Facility: HOSPITAL | Age: 54
LOS: 1 days | End: 2024-10-21
Payer: COMMERCIAL

## 2024-10-21 ENCOUNTER — APPOINTMENT (OUTPATIENT)
Dept: PSYCHIATRY | Facility: CLINIC | Age: 54
End: 2024-10-21

## 2024-10-21 DIAGNOSIS — F43.10 POST-TRAUMATIC STRESS DISORDER, UNSPECIFIED: ICD-10-CM

## 2024-10-21 PROCEDURE — 90832 PSYTX W PT 30 MINUTES: CPT

## 2024-10-22 ENCOUNTER — APPOINTMENT (OUTPATIENT)
Dept: PSYCHIATRY | Facility: CLINIC | Age: 54
End: 2024-10-22
Payer: COMMERCIAL

## 2024-10-22 ENCOUNTER — OUTPATIENT (OUTPATIENT)
Dept: OUTPATIENT SERVICES | Facility: HOSPITAL | Age: 54
LOS: 1 days | End: 2024-10-22
Payer: COMMERCIAL

## 2024-10-22 DIAGNOSIS — F43.10 POST-TRAUMATIC STRESS DISORDER, UNSPECIFIED: ICD-10-CM

## 2024-10-22 DIAGNOSIS — F32.1 MAJOR DEPRESSIVE DISORDER, SINGLE EPISODE, MODERATE: ICD-10-CM

## 2024-10-22 DIAGNOSIS — F41.9 ANXIETY DISORDER, UNSPECIFIED: ICD-10-CM

## 2024-10-22 PROCEDURE — 99214 OFFICE O/P EST MOD 30 MIN: CPT | Mod: 95

## 2024-11-12 ENCOUNTER — OUTPATIENT (OUTPATIENT)
Dept: OUTPATIENT SERVICES | Facility: HOSPITAL | Age: 54
LOS: 1 days | End: 2024-11-12
Payer: COMMERCIAL

## 2024-11-12 ENCOUNTER — APPOINTMENT (OUTPATIENT)
Dept: PSYCHIATRY | Facility: CLINIC | Age: 54
End: 2024-11-12

## 2024-11-12 DIAGNOSIS — F43.10 POST-TRAUMATIC STRESS DISORDER, UNSPECIFIED: ICD-10-CM

## 2024-11-12 PROCEDURE — 90832 PSYTX W PT 30 MINUTES: CPT

## 2024-11-13 DIAGNOSIS — F43.10 POST-TRAUMATIC STRESS DISORDER, UNSPECIFIED: ICD-10-CM

## 2024-11-26 ENCOUNTER — OUTPATIENT (OUTPATIENT)
Dept: OUTPATIENT SERVICES | Facility: HOSPITAL | Age: 54
LOS: 1 days | End: 2024-11-26
Payer: COMMERCIAL

## 2024-11-26 ENCOUNTER — APPOINTMENT (OUTPATIENT)
Dept: PSYCHIATRY | Facility: CLINIC | Age: 54
End: 2024-11-26
Payer: COMMERCIAL

## 2024-11-26 DIAGNOSIS — F43.10 POST-TRAUMATIC STRESS DISORDER, UNSPECIFIED: ICD-10-CM

## 2024-11-26 DIAGNOSIS — F32.1 MAJOR DEPRESSIVE DISORDER, SINGLE EPISODE, MODERATE: ICD-10-CM

## 2024-11-26 DIAGNOSIS — F41.9 ANXIETY DISORDER, UNSPECIFIED: ICD-10-CM

## 2024-11-26 PROCEDURE — 99214 OFFICE O/P EST MOD 30 MIN: CPT | Mod: 95

## 2024-12-02 ENCOUNTER — APPOINTMENT (OUTPATIENT)
Dept: PSYCHIATRY | Facility: CLINIC | Age: 54
End: 2024-12-02

## 2024-12-02 ENCOUNTER — OUTPATIENT (OUTPATIENT)
Dept: OUTPATIENT SERVICES | Facility: HOSPITAL | Age: 54
LOS: 1 days | End: 2024-12-02
Payer: COMMERCIAL

## 2024-12-02 PROCEDURE — 90832 PSYTX W PT 30 MINUTES: CPT

## 2024-12-23 ENCOUNTER — OUTPATIENT (OUTPATIENT)
Dept: OUTPATIENT SERVICES | Facility: HOSPITAL | Age: 54
LOS: 1 days | End: 2024-12-23
Payer: COMMERCIAL

## 2024-12-23 ENCOUNTER — APPOINTMENT (OUTPATIENT)
Dept: PSYCHIATRY | Facility: CLINIC | Age: 54
End: 2024-12-23

## 2024-12-23 PROCEDURE — 90832 PSYTX W PT 30 MINUTES: CPT

## 2025-01-14 ENCOUNTER — APPOINTMENT (OUTPATIENT)
Dept: PSYCHIATRY | Facility: CLINIC | Age: 55
End: 2025-01-14
Payer: COMMERCIAL

## 2025-01-14 ENCOUNTER — OUTPATIENT (OUTPATIENT)
Dept: OUTPATIENT SERVICES | Facility: HOSPITAL | Age: 55
LOS: 1 days | End: 2025-01-14
Payer: COMMERCIAL

## 2025-01-14 DIAGNOSIS — F41.9 ANXIETY DISORDER, UNSPECIFIED: ICD-10-CM

## 2025-01-14 DIAGNOSIS — F43.10 POST-TRAUMATIC STRESS DISORDER, UNSPECIFIED: ICD-10-CM

## 2025-01-14 DIAGNOSIS — F32.1 MAJOR DEPRESSIVE DISORDER, SINGLE EPISODE, MODERATE: ICD-10-CM

## 2025-01-14 PROCEDURE — 98007 SYNCH AUDIO-VIDEO EST HI 40: CPT

## 2025-01-14 PROCEDURE — 99214 OFFICE O/P EST MOD 30 MIN: CPT | Mod: 95

## 2025-01-15 DIAGNOSIS — F43.10 POST-TRAUMATIC STRESS DISORDER, UNSPECIFIED: ICD-10-CM

## 2025-01-21 ENCOUNTER — APPOINTMENT (OUTPATIENT)
Dept: PSYCHIATRY | Facility: CLINIC | Age: 55
End: 2025-01-21

## 2025-01-21 ENCOUNTER — OUTPATIENT (OUTPATIENT)
Dept: OUTPATIENT SERVICES | Facility: HOSPITAL | Age: 55
LOS: 1 days | End: 2025-01-21
Payer: COMMERCIAL

## 2025-01-21 PROCEDURE — 90832 PSYTX W PT 30 MINUTES: CPT

## 2025-01-22 DIAGNOSIS — F43.10 POST-TRAUMATIC STRESS DISORDER, UNSPECIFIED: ICD-10-CM

## 2025-02-04 ENCOUNTER — APPOINTMENT (OUTPATIENT)
Dept: PSYCHIATRY | Facility: CLINIC | Age: 55
End: 2025-02-04

## 2025-02-04 ENCOUNTER — OUTPATIENT (OUTPATIENT)
Dept: OUTPATIENT SERVICES | Facility: HOSPITAL | Age: 55
LOS: 1 days | End: 2025-02-04
Payer: COMMERCIAL

## 2025-02-04 DIAGNOSIS — F43.10 POST-TRAUMATIC STRESS DISORDER, UNSPECIFIED: ICD-10-CM

## 2025-02-04 PROCEDURE — 90832 PSYTX W PT 30 MINUTES: CPT

## 2025-02-11 ENCOUNTER — OUTPATIENT (OUTPATIENT)
Dept: OUTPATIENT SERVICES | Facility: HOSPITAL | Age: 55
LOS: 1 days | End: 2025-02-11
Payer: COMMERCIAL

## 2025-02-11 ENCOUNTER — APPOINTMENT (OUTPATIENT)
Dept: PSYCHIATRY | Facility: CLINIC | Age: 55
End: 2025-02-11
Payer: COMMERCIAL

## 2025-02-11 DIAGNOSIS — F41.9 ANXIETY DISORDER, UNSPECIFIED: ICD-10-CM

## 2025-02-11 DIAGNOSIS — F43.10 POST-TRAUMATIC STRESS DISORDER, UNSPECIFIED: ICD-10-CM

## 2025-02-11 DIAGNOSIS — F32.1 MAJOR DEPRESSIVE DISORDER, SINGLE EPISODE, MODERATE: ICD-10-CM

## 2025-02-11 PROCEDURE — 98007 SYNCH AUDIO-VIDEO EST HI 40: CPT

## 2025-02-11 PROCEDURE — 99214 OFFICE O/P EST MOD 30 MIN: CPT | Mod: 95

## 2025-02-11 RX ORDER — BUPROPION HYDROCHLORIDE 150 MG/1
150 TABLET, EXTENDED RELEASE ORAL DAILY
Qty: 30 | Refills: 1 | Status: ACTIVE | COMMUNITY
Start: 2025-02-11 | End: 1900-01-01

## 2025-02-25 ENCOUNTER — APPOINTMENT (OUTPATIENT)
Dept: PSYCHIATRY | Facility: CLINIC | Age: 55
End: 2025-02-25

## 2025-02-25 ENCOUNTER — OUTPATIENT (OUTPATIENT)
Dept: OUTPATIENT SERVICES | Facility: HOSPITAL | Age: 55
LOS: 1 days | End: 2025-02-25

## 2025-03-18 ENCOUNTER — OUTPATIENT (OUTPATIENT)
Dept: OUTPATIENT SERVICES | Facility: HOSPITAL | Age: 55
LOS: 1 days | End: 2025-03-18
Payer: COMMERCIAL

## 2025-03-18 ENCOUNTER — APPOINTMENT (OUTPATIENT)
Dept: PSYCHIATRY | Facility: CLINIC | Age: 55
End: 2025-03-18
Payer: COMMERCIAL

## 2025-03-18 DIAGNOSIS — F43.10 POST-TRAUMATIC STRESS DISORDER, UNSPECIFIED: ICD-10-CM

## 2025-03-18 DIAGNOSIS — F41.9 ANXIETY DISORDER, UNSPECIFIED: ICD-10-CM

## 2025-03-18 DIAGNOSIS — F32.1 MAJOR DEPRESSIVE DISORDER, SINGLE EPISODE, MODERATE: ICD-10-CM

## 2025-03-18 PROCEDURE — 99214 OFFICE O/P EST MOD 30 MIN: CPT | Mod: 95

## 2025-03-18 PROCEDURE — 98007 SYNCH AUDIO-VIDEO EST HI 40: CPT

## 2025-03-19 DIAGNOSIS — F43.10 POST-TRAUMATIC STRESS DISORDER, UNSPECIFIED: ICD-10-CM

## 2025-03-28 ENCOUNTER — OUTPATIENT (OUTPATIENT)
Dept: OUTPATIENT SERVICES | Facility: HOSPITAL | Age: 55
LOS: 1 days | End: 2025-03-28
Payer: COMMERCIAL

## 2025-03-28 ENCOUNTER — APPOINTMENT (OUTPATIENT)
Dept: PSYCHIATRY | Facility: CLINIC | Age: 55
End: 2025-03-28

## 2025-03-28 DIAGNOSIS — F43.10 POST-TRAUMATIC STRESS DISORDER, UNSPECIFIED: ICD-10-CM

## 2025-03-28 PROCEDURE — 90832 PSYTX W PT 30 MINUTES: CPT

## 2025-03-29 DIAGNOSIS — F43.10 POST-TRAUMATIC STRESS DISORDER, UNSPECIFIED: ICD-10-CM

## 2025-04-15 ENCOUNTER — APPOINTMENT (OUTPATIENT)
Dept: PSYCHIATRY | Facility: CLINIC | Age: 55
End: 2025-04-15
Payer: COMMERCIAL

## 2025-04-15 ENCOUNTER — OUTPATIENT (OUTPATIENT)
Dept: OUTPATIENT SERVICES | Facility: HOSPITAL | Age: 55
LOS: 1 days | End: 2025-04-15
Payer: COMMERCIAL

## 2025-04-15 DIAGNOSIS — F43.10 POST-TRAUMATIC STRESS DISORDER, UNSPECIFIED: ICD-10-CM

## 2025-04-15 DIAGNOSIS — F32.1 MAJOR DEPRESSIVE DISORDER, SINGLE EPISODE, MODERATE: ICD-10-CM

## 2025-04-15 DIAGNOSIS — F41.9 ANXIETY DISORDER, UNSPECIFIED: ICD-10-CM

## 2025-04-15 PROCEDURE — 99214 OFFICE O/P EST MOD 30 MIN: CPT | Mod: 95

## 2025-04-15 PROCEDURE — 98007 SYNCH AUDIO-VIDEO EST HI 40: CPT

## 2025-04-25 ENCOUNTER — APPOINTMENT (OUTPATIENT)
Dept: PSYCHIATRY | Facility: CLINIC | Age: 55
End: 2025-04-25

## 2025-05-16 ENCOUNTER — APPOINTMENT (OUTPATIENT)
Dept: PSYCHIATRY | Facility: CLINIC | Age: 55
End: 2025-05-16
Payer: COMMERCIAL

## 2025-05-16 ENCOUNTER — OUTPATIENT (OUTPATIENT)
Dept: OUTPATIENT SERVICES | Facility: HOSPITAL | Age: 55
LOS: 1 days | End: 2025-05-16
Payer: COMMERCIAL

## 2025-05-16 DIAGNOSIS — F32.1 MAJOR DEPRESSIVE DISORDER, SINGLE EPISODE, MODERATE: ICD-10-CM

## 2025-05-16 DIAGNOSIS — F43.10 POST-TRAUMATIC STRESS DISORDER, UNSPECIFIED: ICD-10-CM

## 2025-05-16 DIAGNOSIS — F41.9 ANXIETY DISORDER, UNSPECIFIED: ICD-10-CM

## 2025-05-16 PROCEDURE — 99214 OFFICE O/P EST MOD 30 MIN: CPT | Mod: 95

## 2025-05-16 PROCEDURE — 98007 SYNCH AUDIO-VIDEO EST HI 40: CPT

## 2025-05-23 ENCOUNTER — OUTPATIENT (OUTPATIENT)
Dept: OUTPATIENT SERVICES | Facility: HOSPITAL | Age: 55
LOS: 1 days | End: 2025-05-23
Payer: COMMERCIAL

## 2025-05-23 ENCOUNTER — APPOINTMENT (OUTPATIENT)
Dept: PSYCHIATRY | Facility: CLINIC | Age: 55
End: 2025-05-23

## 2025-05-23 DIAGNOSIS — F43.10 POST-TRAUMATIC STRESS DISORDER, UNSPECIFIED: ICD-10-CM

## 2025-05-23 PROCEDURE — 90832 PSYTX W PT 30 MINUTES: CPT

## 2025-05-24 DIAGNOSIS — F43.10 POST-TRAUMATIC STRESS DISORDER, UNSPECIFIED: ICD-10-CM

## 2025-06-13 ENCOUNTER — APPOINTMENT (OUTPATIENT)
Dept: PSYCHIATRY | Facility: CLINIC | Age: 55
End: 2025-06-13
Payer: COMMERCIAL

## 2025-06-13 ENCOUNTER — OUTPATIENT (OUTPATIENT)
Dept: OUTPATIENT SERVICES | Facility: HOSPITAL | Age: 55
LOS: 1 days | End: 2025-06-13
Payer: COMMERCIAL

## 2025-06-13 PROCEDURE — 99214 OFFICE O/P EST MOD 30 MIN: CPT | Mod: 95

## 2025-06-13 PROCEDURE — 98007 SYNCH AUDIO-VIDEO EST HI 40: CPT

## 2025-06-14 DIAGNOSIS — F43.10 POST-TRAUMATIC STRESS DISORDER, UNSPECIFIED: ICD-10-CM

## 2025-06-20 ENCOUNTER — OUTPATIENT (OUTPATIENT)
Dept: OUTPATIENT SERVICES | Facility: HOSPITAL | Age: 55
LOS: 1 days | End: 2025-06-20
Payer: COMMERCIAL

## 2025-06-20 ENCOUNTER — APPOINTMENT (OUTPATIENT)
Dept: PSYCHIATRY | Facility: CLINIC | Age: 55
End: 2025-06-20

## 2025-06-20 DIAGNOSIS — F43.10 POST-TRAUMATIC STRESS DISORDER, UNSPECIFIED: ICD-10-CM

## 2025-06-20 PROCEDURE — 90832 PSYTX W PT 30 MINUTES: CPT

## 2025-07-25 ENCOUNTER — APPOINTMENT (OUTPATIENT)
Dept: PSYCHIATRY | Facility: CLINIC | Age: 55
End: 2025-07-25

## 2025-08-12 ENCOUNTER — APPOINTMENT (OUTPATIENT)
Dept: PSYCHIATRY | Facility: CLINIC | Age: 55
End: 2025-08-12
Payer: COMMERCIAL

## 2025-08-12 ENCOUNTER — OUTPATIENT (OUTPATIENT)
Dept: OUTPATIENT SERVICES | Facility: HOSPITAL | Age: 55
LOS: 1 days | End: 2025-08-12
Payer: COMMERCIAL

## 2025-08-12 DIAGNOSIS — F32.1 MAJOR DEPRESSIVE DISORDER, SINGLE EPISODE, MODERATE: ICD-10-CM

## 2025-08-12 DIAGNOSIS — F41.9 ANXIETY DISORDER, UNSPECIFIED: ICD-10-CM

## 2025-08-12 DIAGNOSIS — F43.10 POST-TRAUMATIC STRESS DISORDER, UNSPECIFIED: ICD-10-CM

## 2025-08-12 PROCEDURE — 99214 OFFICE O/P EST MOD 30 MIN: CPT | Mod: 95

## 2025-08-12 PROCEDURE — 98007 SYNCH AUDIO-VIDEO EST HI 40: CPT

## 2025-08-22 ENCOUNTER — OUTPATIENT (OUTPATIENT)
Dept: OUTPATIENT SERVICES | Facility: HOSPITAL | Age: 55
LOS: 1 days | End: 2025-08-22
Payer: COMMERCIAL

## 2025-08-22 ENCOUNTER — APPOINTMENT (OUTPATIENT)
Dept: PSYCHIATRY | Facility: CLINIC | Age: 55
End: 2025-08-22

## 2025-08-22 PROCEDURE — 90832 PSYTX W PT 30 MINUTES: CPT

## 2025-08-23 DIAGNOSIS — F43.10 POST-TRAUMATIC STRESS DISORDER, UNSPECIFIED: ICD-10-CM

## 2025-09-16 ENCOUNTER — APPOINTMENT (OUTPATIENT)
Dept: PSYCHIATRY | Facility: CLINIC | Age: 55
End: 2025-09-16
Payer: COMMERCIAL

## 2025-09-16 DIAGNOSIS — F41.9 ANXIETY DISORDER, UNSPECIFIED: ICD-10-CM

## 2025-09-16 DIAGNOSIS — F43.10 POST-TRAUMATIC STRESS DISORDER, UNSPECIFIED: ICD-10-CM

## 2025-09-16 DIAGNOSIS — F32.1 MAJOR DEPRESSIVE DISORDER, SINGLE EPISODE, MODERATE: ICD-10-CM

## 2025-09-16 PROCEDURE — 99214 OFFICE O/P EST MOD 30 MIN: CPT | Mod: 95

## 2025-09-18 ENCOUNTER — APPOINTMENT (OUTPATIENT)
Dept: PSYCHIATRY | Facility: CLINIC | Age: 55
End: 2025-09-18